# Patient Record
Sex: FEMALE | Race: WHITE
[De-identification: names, ages, dates, MRNs, and addresses within clinical notes are randomized per-mention and may not be internally consistent; named-entity substitution may affect disease eponyms.]

---

## 2019-04-17 ENCOUNTER — HOSPITAL ENCOUNTER (EMERGENCY)
Dept: HOSPITAL 96 - M.ERS | Age: 39
Discharge: HOME | End: 2019-04-17
Payer: COMMERCIAL

## 2019-04-17 VITALS — BODY MASS INDEX: 31.28 KG/M2 | WEIGHT: 170 LBS | HEIGHT: 62 IN

## 2019-04-17 VITALS — BODY MASS INDEX: 31.28 KG/M2 | HEIGHT: 62 IN | WEIGHT: 170 LBS

## 2019-04-17 VITALS — DIASTOLIC BLOOD PRESSURE: 82 MMHG | SYSTOLIC BLOOD PRESSURE: 120 MMHG

## 2019-04-17 VITALS — DIASTOLIC BLOOD PRESSURE: 87 MMHG | SYSTOLIC BLOOD PRESSURE: 127 MMHG

## 2019-04-17 DIAGNOSIS — Z90.710: ICD-10-CM

## 2019-04-17 DIAGNOSIS — M06.9: ICD-10-CM

## 2019-04-17 DIAGNOSIS — Z91.040: ICD-10-CM

## 2019-04-17 DIAGNOSIS — Z88.8: ICD-10-CM

## 2019-04-17 DIAGNOSIS — F17.210: ICD-10-CM

## 2019-04-17 DIAGNOSIS — R51: Primary | ICD-10-CM

## 2019-04-17 DIAGNOSIS — G43.909: Primary | ICD-10-CM

## 2019-04-17 DIAGNOSIS — Z98.890: ICD-10-CM

## 2019-08-14 ENCOUNTER — HOSPITAL ENCOUNTER (EMERGENCY)
Dept: HOSPITAL 96 - M.ERS | Age: 39
Discharge: HOME | End: 2019-08-14
Payer: COMMERCIAL

## 2019-08-14 VITALS — DIASTOLIC BLOOD PRESSURE: 82 MMHG | SYSTOLIC BLOOD PRESSURE: 130 MMHG

## 2019-08-14 VITALS — WEIGHT: 180.01 LBS | HEIGHT: 62 IN | BODY MASS INDEX: 33.13 KG/M2

## 2019-08-14 DIAGNOSIS — F17.210: ICD-10-CM

## 2019-08-14 DIAGNOSIS — R07.89: Primary | ICD-10-CM

## 2019-08-14 DIAGNOSIS — Z98.890: ICD-10-CM

## 2019-08-14 DIAGNOSIS — Z88.8: ICD-10-CM

## 2019-08-14 DIAGNOSIS — M06.9: ICD-10-CM

## 2019-08-14 DIAGNOSIS — Z91.040: ICD-10-CM

## 2019-08-14 DIAGNOSIS — Z90.13: ICD-10-CM

## 2019-08-14 DIAGNOSIS — Z90.710: ICD-10-CM

## 2019-08-14 LAB
ABSOLUTE BASOPHILS: 0 THOU/UL (ref 0–0.2)
ABSOLUTE EOSINOPHILS: 0.3 THOU/UL (ref 0–0.7)
ABSOLUTE MONOCYTES: 0.7 THOU/UL (ref 0–1.2)
ALBUMIN SERPL-MCNC: 3.7 G/DL (ref 3.4–5)
ALP SERPL-CCNC: 75 U/L (ref 46–116)
ALT SERPL-CCNC: 25 U/L (ref 30–65)
ANION GAP SERPL CALC-SCNC: 8 MMOL/L (ref 7–16)
APTT BLD: 27.3 SECONDS (ref 25–31.3)
AST SERPL-CCNC: 14 U/L (ref 15–37)
BASOPHILS NFR BLD AUTO: 0.4 %
BILIRUB SERPL-MCNC: 0.3 MG/DL
BUN SERPL-MCNC: 7 MG/DL (ref 7–18)
CALCIUM SERPL-MCNC: 8.7 MG/DL (ref 8.5–10.1)
CHLORIDE SERPL-SCNC: 105 MMOL/L (ref 98–107)
CK-MB MASS: 0.7 NG/ML
CO2 SERPL-SCNC: 26 MMOL/L (ref 21–32)
CREAT SERPL-MCNC: 1.1 MG/DL (ref 0.6–1.3)
EOSINOPHIL NFR BLD: 3.1 %
GLUCOSE SERPL-MCNC: 100 MG/DL (ref 70–99)
GRANULOCYTES NFR BLD MANUAL: 52.3 %
HCT VFR BLD CALC: 42.4 % (ref 37–47)
HGB BLD-MCNC: 14.7 GM/DL (ref 12–15)
INR PPP: 1
LIPASE: 327 U/L (ref 73–393)
LYMPHOCYTES # BLD: 3.6 THOU/UL (ref 0.8–5.3)
LYMPHOCYTES NFR BLD AUTO: 37.1 %
MAGNESIUM SERPL-MCNC: 2.1 MG/DL (ref 1.8–2.4)
MCH RBC QN AUTO: 30.5 PG (ref 26–34)
MCHC RBC AUTO-ENTMCNC: 34.7 G/DL (ref 28–37)
MCV RBC: 87.9 FL (ref 80–100)
MONOCYTES NFR BLD: 7.1 %
MPV: 8.6 FL. (ref 7.2–11.1)
NEUTROPHILS # BLD: 5 THOU/UL (ref 1.6–8.1)
NT-PRO BRAIN NAT PEPTIDE: 52 PG/ML (ref ?–300)
NUCLEATED RBCS: 0 /100WBC
PLATELET COUNT*: 212 THOU/UL (ref 150–400)
POTASSIUM SERPL-SCNC: 3.6 MMOL/L (ref 3.5–5.1)
PROT SERPL-MCNC: 7.1 G/DL (ref 6.4–8.2)
PROTHROMBIN TIME: 9.8 SECONDS (ref 9.2–11.5)
RBC # BLD AUTO: 4.83 MIL/UL (ref 4.2–5)
RDW-CV: 13 % (ref 10.5–14.5)
SODIUM SERPL-SCNC: 139 MMOL/L (ref 136–145)
TROPONIN-I LEVEL: <0.06 NG/ML (ref ?–0.06)
WBC # BLD AUTO: 9.6 THOU/UL (ref 4–11)

## 2019-08-15 NOTE — EKG
[place] [address]
Phone:  [phone]                     ELECTROCARDIOGRAM REPORT      
_______________________________________________________________________________
 
Name:       OSMAR ELLISON                  Room:                      [facility]#:  [id number]      Account #:      [id number]  
Admission:  19     Attend Phys:                         
Discharge:  19     Date of Birth:  [birth year]  
         Report #: [id number]
    [id number]
_______________________________________________________________________________
THIS REPORT FOR:  //name//                      
 
                         [facility] ED
                                       
Test Date:    2019               Test Time:    19:37:39
Pat Name:     OSMAR ELLISON              Department:   
Patient ID:   [id number]            Room:          
Gender:       F                        Technician:   LAUREL
:          1980               Requested By: Pillo Dennison
Order Number: [id number] MD:   Alec Norwood
                                 Measurements
Intervals                              Axis          
Rate:         80                       P:            25
OK:           153                      QRS:          12
QRSD:         113                      T:            16
QT:           385                                    
QTc:          445                                    
                           Interpretive Statements
Sinus rhythm
Low voltage, precordial leads
Borderline T abnormalities, anterior leads
Compared to ECG 2017 01:07:43
Low QRS voltage now present
T-wave abnormality still present
 
Electronically Signed On 8- 10:54:07 CDT by Alec Norwood
[email or web address]
 
 
 
 
 
 
 
 
 
 
 
 
 
 
 
 
  <ELECTRONICALLY SIGNED>
                                           By: Alec Norwood MD, [facility]   
  08/15/19     1054
D: 19   _____________________________________
T: 19   Alec Norwood MD, [facility]     /EPI

## 2021-12-12 ENCOUNTER — HOSPITAL ENCOUNTER (EMERGENCY)
Dept: HOSPITAL 96 - M.ERS | Age: 41
Discharge: HOME | End: 2021-12-12
Payer: COMMERCIAL

## 2021-12-12 VITALS — DIASTOLIC BLOOD PRESSURE: 87 MMHG | SYSTOLIC BLOOD PRESSURE: 135 MMHG

## 2021-12-12 VITALS — WEIGHT: 188.01 LBS | HEIGHT: 62 IN | BODY MASS INDEX: 34.6 KG/M2
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
## 2021-12-12 DIAGNOSIS — Z90.89: ICD-10-CM
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
## 2021-12-12 DIAGNOSIS — Z91.040: ICD-10-CM

## 2021-12-12 DIAGNOSIS — M06.9: ICD-10-CM

## 2021-12-12 DIAGNOSIS — S16.1XXA: Primary | ICD-10-CM

## 2021-12-12 DIAGNOSIS — Z90.710: ICD-10-CM

## 2021-12-12 DIAGNOSIS — F17.210: ICD-10-CM
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
## 2021-12-12 DIAGNOSIS — Z88.8: ICD-10-CM

## 2021-12-12 DIAGNOSIS — Z79.899: ICD-10-CM
